# Patient Record
(demographics unavailable — no encounter records)

---

## 2025-01-09 NOTE — PHYSICAL EXAM
[Chaperone Present] : A chaperone was present in the examining room during all aspects of the physical examination [[id number]] : A chaperone was present during the pelvic exam. [Appropriately responsive] : appropriately responsive [Alert] : alert [No Acute Distress] : no acute distress [No Lymphadenopathy] : no lymphadenopathy [Regular Rate Rhythm] : regular rate rhythm [No Murmurs] : no murmurs [Clear to Auscultation B/L] : clear to auscultation bilaterally [Soft] : soft [Non-tender] : non-tender [Non-distended] : non-distended [No HSM] : No HSM [No Lesions] : no lesions [No Mass] : no mass [Oriented x3] : oriented x3 [Examination Of The Breasts] : a normal appearance [No Masses] : no breast masses were palpable [Labia Majora] : normal [Labia Minora] : normal [Normal] : normal [Normal Position] : in a normal position [Uterine Adnexae] : non-palpable [FreeTextEntry2] : AMADEO Willams [Tenderness] : nontender [FreeTextEntry5] : no strings teased with cyto brush

## 2025-01-09 NOTE — HISTORY OF PRESENT ILLNESS
[FreeTextEntry1] : 43yo P1 here for annual gyn. She has a Mirena since 11/11/ 2022 placed by Virgilio. She has not been able to feel strings. Used Mirena for heavy bleeding. Denies heavy bleeding, finds IUD overall helpful for bleeding. Weigh training 1-2 days a week. Sexually active in monogamous.     She has h/o fibroids, s/p D&C, Hysteroscopy 2019 for suspected polyp vs SM fibroid (no SM fibroid seen).  Pt also has a PSH R oophorectomy for 20cm ovarian mass which was benign 10/4/20  She has some periods with Mirena but they are unpredictable in timing and not heavy since placement.   She followed by Dr. Hunter for breast imaging/ exams regularly b/c of previous need for biopsy and FH of breast CA (mother at age 39yo).   Pt is ; daughter is 4yo. Pt works as a professor at [employer].

## 2025-01-09 NOTE — HISTORY OF PRESENT ILLNESS
[FreeTextEntry1] : 45yo P1 here for annual gyn. She has a Mirena since 11/11/ 2022 placed by Virgilio. She has not been able to feel strings. Used Mirena for heavy bleeding. Denies heavy bleeding, finds IUD overall helpful for bleeding. Weigh training 1-2 days a week. Sexually active in monogamous.     She has h/o fibroids, s/p D&C, Hysteroscopy 2019 for suspected polyp vs SM fibroid (no SM fibroid seen).  Pt also has a PSH R oophorectomy for 20cm ovarian mass which was benign 10/4/20  She has some periods with Mirena but they are unpredictable in timing and not heavy since placement.   She followed by Dr. Hunter for breast imaging/ exams regularly b/c of previous need for biopsy and FH of breast CA (mother at age 37yo).   Pt is ; daughter is 4yo. Pt works as a professor at [employer].

## 2025-01-09 NOTE — COUNSELING
[Nutrition/ Exercise/ Weight Management] : nutrition, exercise, weight management [Vitamins/Supplements] : vitamins/supplements [Sunscreen] : sunscreen [Drugs] : drugs [Breast Self Exam] : breast self exam [Contraception/ Emergency Contraception/ Safe Sexual Practices] : contraception, emergency contraception, safe sexual practices [Influenza Vaccine] : influenza vaccine

## 2025-01-09 NOTE — PHYSICAL EXAM
[Chaperone Present] : A chaperone was present in the examining room during all aspects of the physical examination [[id number]] : A chaperone was present during the pelvic exam. [Appropriately responsive] : appropriately responsive [Alert] : alert [No Acute Distress] : no acute distress [No Lymphadenopathy] : no lymphadenopathy [Regular Rate Rhythm] : regular rate rhythm [No Murmurs] : no murmurs [Clear to Auscultation B/L] : clear to auscultation bilaterally [Soft] : soft [Non-tender] : non-tender [Non-distended] : non-distended [No HSM] : No HSM [No Lesions] : no lesions [No Mass] : no mass [Oriented x3] : oriented x3 [Examination Of The Breasts] : a normal appearance [No Masses] : no breast masses were palpable [Labia Majora] : normal [Labia Minora] : normal [Normal] : normal [Normal Position] : in a normal position [Uterine Adnexae] : non-palpable [FreeTextEntry2] : AMADEO Willams [Tenderness] : nontender [FreeTextEntry5] : no strings teased with cyto brush

## 2025-03-14 NOTE — END OF VISIT
[Time Spent: ___ minutes] : I have spent [unfilled] minutes of time on the encounter which excludes teaching and separately reported services. [FreeTextEntry3] :  I, Barbara Kline, am scribing for and in the presence of Dr. Jayne Zapata in the following sections: HISTORY OF PRESENT ILLNESS; REVIEW OF SYSTEMS; PHYSICAL EXAM; ASSESSMENT/ PLAN. I, Jayne Zapata, personally performed the services described in the documentation, reviewed the documentation recorded by the scribe in my presence, and it accurately and completely records my words and actions. 03/14/25

## 2025-03-14 NOTE — HISTORY OF PRESENT ILLNESS
[Home] : at home, [unfilled] , at the time of the visit. [Medical Office: ([facility])___] : at the medical office located in  [Telehealth (audio & video)] : This visit was provided via telehealth using real-time 2-way audio visual technology. [Verbal consent obtained from patient] : the patient, [unfilled] [FreeTextEntry1] : Ms. JOEY WONG is a 44 year old female noncompliant pt  sent in a consult by Amna Hardy for hypothyroidism and MNG, aunt has h/o thyroid cancer  today  f/u  her  is also a pt of mine with type 1 DM was diagnosed in [facility] in 2015 , her TSH was 4 per pt, seen Endo there who started her on Synthroid 88mcg qd same dose for few years has a 4yo girl  , the dose was adjusted during pregnancy , had IUI planing for more kids , no protection now   did not have the chance to repeat US as advised  back at her diagnosis she was complaining of fatigue now better per pt  has astigmatism has contact lenses feels like has something on her contact lenses , not felt while wearing the classes, will see Ophthalmology   7/23/24 follow up- Pt here on a 2- year f/u.  Doing well. No complaints today.  Taking Synthroid 88 mcg qd w/o issues.  Did not have repeat US done yet. Overdue. Last FNA 2020.   03/14/25 The patient is doing well, no active complaints.

## 2025-03-14 NOTE — PHYSICAL EXAM
[Alert] : alert [Well Nourished] : well nourished [No Acute Distress] : no acute distress [Well Developed] : well developed [No Respiratory Distress] : no respiratory distress [Oriented x3] : oriented to person, place, and time [No Rash] : no rash [Acanthosis Nigricans] : no acanthosis nigricans [de-identified] : on face

## 2025-05-20 NOTE — CONSULT LETTER
[Dear  ___] : Dear  [unfilled], [Courtesy Letter:] : I had the pleasure of seeing your patient, [unfilled], in my office today. [Please see my note below.] : Please see my note below. [Consult Closing:] : Thank you very much for allowing me to participate in the care of this patient.  If you have any questions, please do not hesitate to contact me. [Sincerely,] : Sincerely, [DrZina  ___] : Dr. HOWARD [FreeTextEntry3] : Taina Hunter [place] [address]

## 2025-05-20 NOTE — HISTORY OF PRESENT ILLNESS
[FreeTextEntry1] : This is a 44 yo female referred by Dr. Tang for high risk due to family history. Her mother was diagnosed with breast cancer at age 38 and was not genetically tested Her mother went on to develop metastatic disease. The patient was previously followed for high risk in [place].  The patient did have infertility treatment.  She is anxious about not having breast imaging due to pregnancy and lactating and does not want to skip the next few years in the event that she may become pregnant again. Her daughter yarelis is doing well.  She is status post right breast stereotactic biopsy retroareolar on June 6, 2018. Pathology shows benign breast tissue with calcium oxalate crystals associated with apocrine cyst.   She is s/p left UIQ MR bx 10/25/2021 pathology showed PASH, IDP, and left 9:00 MR bx radial scar appeared completed excised.  She is s/p left breast USGbx on 5/10/22, pathology showed Nodular sclerosing adenosis, and PASH. On 11/5/2024 pathology left breast UIQ (cork clip) MRI bx PASH and CCC with hyperplasia. She has no breast complaints today.  She does SBE.  She has not noticed a change in her breast or a breast lump.  She has not noticed a change in her nipple or nipple area. She has not noticed a change in the skin of the breast. She is not experiencing nipple discharge. She is not experiencing breast pain. She does experience intermittent bilateral breast pain which is cyclical with her cycle She has not noticed a lump or lymph node under the armpit.     BREAST CANCER RISK FACTORS Menarche:  12 Date of LMP: 04/28/2025  Menopause: pre Grav: 2     Para: 1 (10yo old daughter) Age at first live birth: 36 Nursed: yes stopped FEB 2018 Hysterectomy: no Oophorectomy:  yes unilateral OCP:  yes 14 years HRT: no Last pap/pelvic exam: 01/03/2025 WNL  Related family history:  Mother BCA@ 38 Ashkenazi: no Mastery risk assessment:  BRCAPRO 12.1%, TCv6 27.1%, TCv7 33.8%, [place] 19.4%, Tobias 15.3% BRCA testing: yes MUTYH mutation Bra size:  34 C    Last mammogram: 10/25/2024            Location:   [facility]  Report reviewed.                                 Images reviewed. On PACS Results: BIRADS 1 HIPOLITO: grade 0  Extremely dense breasts, no mammographic evidence of malignancy.  Last ultrasound:  never                         Location: Report reviewed.                       Images reviewed.   Results: BI-RADS 2 Slightlty smaller biopsy-proven benign mass in the left 5-6:00 axis, 4cm FN.   Last MRI: 11/05/2024    Bx LEFT              Location: [facility] Report reviewed. Results: . LEFT BREAST UPPER INNER POSTERIOR (CORK MARKER), MRI BIOPSY: PSEUDOANGIOMATOUS STROMAL HYPERPLASIA, BENIGN BREAST WITH FIBROCYSTIC CHANGES WITH COLUMNAR CELL CHANGE/HYPERPLASIA WITH CALCIFICATIONS AND CYSTIC AND PAPILLARY APOCRINE METAPLASIA

## 2025-05-20 NOTE — HISTORY OF PRESENT ILLNESS
[FreeTextEntry1] : This is a 46 yo female referred by Dr. Tang for high risk due to family history. Her mother was diagnosed with breast cancer at age 38 and was not genetically tested Her mother went on to develop metastatic disease. The patient was previously followed for high risk in [place].  The patient did have infertility treatment.  She is anxious about not having breast imaging due to pregnancy and lactating and does not want to skip the next few years in the event that she may become pregnant again. Her daughter yarelis is doing well.  She is status post right breast stereotactic biopsy retroareolar on June 6, 2018. Pathology shows benign breast tissue with calcium oxalate crystals associated with apocrine cyst.   She is s/p left UIQ MR bx 10/25/2021 pathology showed PASH, IDP, and left 9:00 MR bx radial scar appeared completed excised.  She is s/p left breast USGbx on 5/10/22, pathology showed Nodular sclerosing adenosis, and PASH. On 11/5/2024 pathology left breast UIQ (cork clip) MRI bx PASH and CCC with hyperplasia. She has no breast complaints today.  She does SBE.  She has not noticed a change in her breast or a breast lump.  She has not noticed a change in her nipple or nipple area. She has not noticed a change in the skin of the breast. She is not experiencing nipple discharge. She is not experiencing breast pain. She does experience intermittent bilateral breast pain which is cyclical with her cycle She has not noticed a lump or lymph node under the armpit.     BREAST CANCER RISK FACTORS Menarche:  12 Date of LMP: 04/28/2025  Menopause: pre Grav: 2     Para: 1 (10yo old daughter) Age at first live birth: 36 Nursed: yes stopped FEB 2018 Hysterectomy: no Oophorectomy:  yes unilateral OCP:  yes 14 years HRT: no Last pap/pelvic exam: 01/03/2025 WNL  Related family history:  Mother BCA@ 38 Ashkenazi: no Mastery risk assessment:  BRCAPRO 12.1%, TCv6 27.1%, TCv7 33.8%, [place] 19.4%, Tobias 15.3% BRCA testing: yes MUTYH mutation Bra size:  34 C    Last mammogram: 10/25/2024            Location:   [facility]  Report reviewed.                                 Images reviewed. On PACS Results: BIRADS 1 HIPOLITO: grade 0  Extremely dense breasts, no mammographic evidence of malignancy.  Last ultrasound:  never                         Location: Report reviewed.                       Images reviewed.   Results: BI-RADS 2 Slightlty smaller biopsy-proven benign mass in the left 5-6:00 axis, 4cm FN.   Last MRI: 11/05/2024    Bx LEFT              Location: [facility] Report reviewed. Results: . LEFT BREAST UPPER INNER POSTERIOR (CORK MARKER), MRI BIOPSY: PSEUDOANGIOMATOUS STROMAL HYPERPLASIA, BENIGN BREAST WITH FIBROCYSTIC CHANGES WITH COLUMNAR CELL CHANGE/HYPERPLASIA WITH CALCIFICATIONS AND CYSTIC AND PAPILLARY APOCRINE METAPLASIA

## 2025-05-20 NOTE — PHYSICAL EXAM
[Normocephalic] : normocephalic [Atraumatic] : atraumatic [Supple] : supple [No Supraclavicular Adenopathy] : no supraclavicular adenopathy [Examined in the supine and seated position] : examined in the supine and seated position [Symmetrical] : symmetrical [No dominant masses] : no dominant masses in right breast  [No dominant masses] : no dominant masses left breast [No Nipple Retraction] : no left nipple retraction [No Nipple Discharge] : no left nipple discharge [No Axillary Lymphadenopathy] : no left axillary lymphadenopathy [No Edema] : no edema [No Rashes] : no rashes [No Ulceration] : no ulceration [de-identified] : nodularity 9:00 c/w dense breast tissue [de-identified] : no masses

## 2025-05-20 NOTE — REVIEW OF SYSTEMS
[Painful Periods] : painful periods [Irregular Cycle Intervals] : periods are irregular [Anxiety] : anxiety [Negative] : Heme/Lymph [FreeTextEntry3] : sinus pressure [de-identified] : headache

## 2025-05-20 NOTE — CONSULT LETTER
[Dear  ___] : Dear  [unfilled], [Courtesy Letter:] : I had the pleasure of seeing your patient, [unfilled], in my office today. [Please see my note below.] : Please see my note below. [Consult Closing:] : Thank you very much for allowing me to participate in the care of this patient.  If you have any questions, please do not hesitate to contact me. [Sincerely,] : Sincerely, [DrZina  ___] : Dr. HOWARD [FreeTextEntry3] : Taina Hunter [place] [address]

## 2025-05-20 NOTE — REVIEW OF SYSTEMS
[Painful Periods] : painful periods [Irregular Cycle Intervals] : periods are irregular [Anxiety] : anxiety [Negative] : Heme/Lymph [FreeTextEntry3] : sinus pressure [de-identified] : headache

## 2025-05-20 NOTE — ASSESSMENT
[FreeTextEntry1] : This is a 45-year-old high-risk female Plan for mammogram and MRI OCT 2025 discussed MIRENA and possible increased risk of breast cancer, she plans to stay on it for now discussed pro/cons of observation vs ebbx am very comfortable to continue observation  We reviewed risk reduction strategies including maintaining a BMI <25, limiting red meat intake and alcoholic beverages to 3 per week and exercise (150 min/ week low intensity or 75 min/week high intensity). And maintaining a normal vitamin D level. reviewed genetics results--rec she establish care with GI doctor asap She will see me in 1 year, 6 months gyn She knows to call or return sooner should any concerns or questions arise.

## 2025-05-20 NOTE — PHYSICAL EXAM
[Normocephalic] : normocephalic [Atraumatic] : atraumatic [Supple] : supple [No Supraclavicular Adenopathy] : no supraclavicular adenopathy [Examined in the supine and seated position] : examined in the supine and seated position [Symmetrical] : symmetrical [No dominant masses] : no dominant masses in right breast  [No dominant masses] : no dominant masses left breast [No Nipple Retraction] : no left nipple retraction [No Nipple Discharge] : no left nipple discharge [No Axillary Lymphadenopathy] : no left axillary lymphadenopathy [No Edema] : no edema [No Rashes] : no rashes [No Ulceration] : no ulceration [de-identified] : nodularity 9:00 c/w dense breast tissue [de-identified] : no masses